# Patient Record
Sex: MALE | Race: WHITE
[De-identification: names, ages, dates, MRNs, and addresses within clinical notes are randomized per-mention and may not be internally consistent; named-entity substitution may affect disease eponyms.]

---

## 2018-11-11 ENCOUNTER — HOSPITAL ENCOUNTER (EMERGENCY)
Dept: HOSPITAL 25 - ED | Age: 28
Discharge: HOME | End: 2018-11-11
Payer: COMMERCIAL

## 2018-11-11 VITALS — DIASTOLIC BLOOD PRESSURE: 75 MMHG | SYSTOLIC BLOOD PRESSURE: 113 MMHG
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
## 2018-11-11 DIAGNOSIS — F31.9: ICD-10-CM

## 2018-11-11 DIAGNOSIS — T78.40XA: Primary | ICD-10-CM

## 2018-11-11 PROCEDURE — 99282 EMERGENCY DEPT VISIT SF MDM: CPT

## 2018-11-11 NOTE — ED
Allergic Reaction/Systemic





- HPI Summary


HPI Summary: 


The patient is a 27 y/o M presenting to [facility] with a chief complaint of a 

possible reoccurring allergic reaction with one episode two days ago and 

another episode this morning. Prior to the first reaction, he states that he 

took his medication as usual, which includes Lamictal, lithium, and vitamins. 

He was on a bus to class when he felt his face get red and diaphoretic. In class

, his face and back became very erythematous with hives. He called his 

psychiatrist, who stated that the reaction could have been from the tilapia he 

ate the night before. He was instructed to take benadryl, but he did not take 

it. His symptoms went away by themselves until this morning when they returned 

with extreme itchiness after taking his medications. His medications have not 

changed recently, although the Lamictal dosage increased to 400mg about a month 

ago. His vitamins do not include niacin. He denies dysphagia, throat tightening

, and SOB. 





- History of Current Complaint


Chief Complaint: EDAllergicReaction


Time Seen by Provider: 11/11/18 12:43


Hx Obtained From: Patient


Onset/Duration: Sudden Onset, Started days ago - one episode two days ago, one 

episode this morning, Resolved


Timing: Lasting Hours


Severity Initially: Moderate


Severity Currently: Mild


Pain Intensity: 0


Pain Scale Used: 0-10 Numeric


Location: Discrete @ - face and back


Character: Hives


Aggravating Factor(s): Nothing


Alleviating Factor(s): Nothing


Associated Signs And Symptoms: Positive: Diaphoresis, Rash - itchy and 

erythematous hives on face and back, Other: - NEGATIVE: SOB, dysphagia.  

Negative: Throat Tightening





- Allergies/Home Medications


Allergies/Adverse Reactions: 


 Allergies











Allergy/AdvReac Type Severity Reaction Status Date / Time


 


Unable to Assess Allergy   Verified 11/11/18 13:22











Home Medications: 


 Home Medications





Lamictal 200 mg PO BID 11/11/18 [History Confirmed 11/11/18]


Lithium Carbonate 300 mg cap 150 mg PO DAILY 11/11/18 [History Confirmed 11/11/ 18]


OLANZapine 10 mg PO DAILY 11/11/18 [History Confirmed 11/11/18]











PMH/Surg Hx/FS Hx/Imm Hx


Endocrine/Hematology History: 


   Denies: Hx Diabetes


Respiratory History: 


   Denies: Hx Asthma


Sensory History: 


   Denies: Hx Deafness


Opthamlomology History: 


   Denies: Hx Legally Blind


EENT History: 


   Denies: Hx Deafness


Psychiatric History: Reports: Hx Bipolar Disorder





- Surgical History


Surgery Procedure, Year, and Place: wisdom teeth





- Immunization History


Immunizations Up to Date: Yes


Infectious Disease History: No


Infectious Disease History: 


   Denies: Traveled Outside the [place] in Last 30 Days





- Family History


Known Family History: Positive: Diabetes - Type II


   Negative: Cardiac Disease, Hypertension





- Social History


Alcohol Use: None


Substance Use Type: Reports: None


Smoking Status (MU): Never Smoked Tobacco





Review of Systems


Positive: Skin Diaphoresis


Positive: Other - NEGATIVE: dysphagia, throat tightening


Negative: Shortness Of Breath


Positive: Rash - erythematous hives, Other - facial and back erythema, all over 

itchiness


All Other Systems Reviewed And Are Negative: Yes





Physical Exam





- Summary


Physical Exam Summary: 


Appearance: The patient is well-nourished in no acute distress and in no acute 

pain.


 


Skin: The skin is warm and dry and skin color reflects adequate perfusion.


 


HEENT: The head is normocephalic and atraumatic. The pupils are equal and 

reactive. The conjunctivae are clear and without drainage. Nares are patent and 

without drainage. Mouth reveals moist mucous membranes and the throat is 

without erythema and exudate. The external ears are intact. The ear canals are 

patent and without drainage. The tympanic membranes are intact.


 


Neck: The neck is supple with full range of motion and non-tender. There are no 

carotid bruits. There is no neck vein distension.


 


Respiratory: Chest is non-tender. Lungs are clear to auscultation and breath 

sounds are symmetrical and equal.


 


Cardiovascular: Heart is regular rate and rhythm. There is no murmur or rub 

auscultated. There is no peripheral edema and pulses are symmetrical and equal.


 


Abdomen: The abdomen is soft and non-tender. There are normal bowel sounds 

heard in all four quadrants and there is no organomegaly palpated.


 


Musculoskeletal: There is no back tenderness noted. Extremities are non-tender 

with full range of motion. There is good capillary refill. There is no 

peripheral edema or calf tenderness elicited.


 


Neurological: Patient is alert and oriented to person, place and time. The 

patient has symmetrical motor strength in all four extremities. Cranial nerves 

are grossly intact. Deep tendon reflexes are symmetrical and equal in all four 

extremities.


 


Psychiatric: The patient has an appropriate affect and does not exhibit any 

anxiety or depression.


Triage Information Reviewed: Yes


Vital Signs On Initial Exam: 


 Initial Vitals











Temp Pulse Resp BP Pulse Ox


 


 96.8 F   56   18   124/78   100 


 


 11/11/18 12:22  11/11/18 12:22  11/11/18 12:22  11/11/18 12:22  11/11/18 12:22











Vital Signs Reviewed: Yes





Diagnostics





- Vital Signs


 Vital Signs











  Temp Pulse Resp BP Pulse Ox


 


 11/11/18 12:22  96.8 F  56  18  124/78  100














- Laboratory


Lab Statement: Any lab studies that have been ordered have been reviewed, and 

results considered in the medical decision making process.





Re-Evaluation





- Re-Evaluation


  ** First Eval


Re-Evaluation Time: 14:20


Change: Improved


Comment: Patient is feeling better. He will be discharged home.





Allergic Reaction Course/Dx





- Course


Course Of Treatment: Mr. Case arrived to the emergency department stable 

with minimal symptoms.  He complained of diffuse itching and that he had been 

diffusely red previously at home.  His physical exam was unremarkable here and 

his symptomatology improved with Benadryl.  This happened briefly 2 days ago 

also and it's unclear as to the etiology.  He's been on the same medications 

for a long period of time and none of them are ACE inhibitor's.  I recommended 

symptomatic treatment and follow-up with his prescriber





- Diagnoses


Provider Diagnoses: 


 Allergic reaction








Discharge





- Sign-Out/Discharge


Documenting (check all that apply): Patient Departure - Patient will be 

discharged home.





- Discharge Plan


Condition: Stable


Disposition: HOME


Patient Education Materials:  General Allergic Reaction (ED)


Referrals: 


[facility] – [place] PHYSICIAN REFERRAL [Outside] - 3 Days


Additional Instructions: 


Follow up with your primary care physician in 2-3 days.


Return to the emergency department if any new or worsening symptoms occur.





- Billing Disposition and Condition


Condition: STABLE


Disposition: Home





- Attestation Statements


Document Initiated by Teoibe: Yes


Documenting Scribe: Sonal Swartz


Provider For Whom Tito is Documenting (Include Credential): Dr. Hasmukh Mcdaniel MD


Scribe Attestation: 


Sonal FANG scribed for Dr. Hasmukh Mcdaniel MD on 11/11/18 at 1524. 


Scribe Documentation Reviewed: Yes


Provider Attestation: 


The documentation as recorded by the Sonal tolentino accurately reflects 

the service I personally performed and the decisions made by me, Dr. Hasmukh Mcdaniel MD